# Patient Record
Sex: FEMALE | Race: WHITE
[De-identification: names, ages, dates, MRNs, and addresses within clinical notes are randomized per-mention and may not be internally consistent; named-entity substitution may affect disease eponyms.]

---

## 2017-12-30 ENCOUNTER — HOSPITAL ENCOUNTER (EMERGENCY)
Dept: HOSPITAL 17 - NEPD | Age: 42
Discharge: HOME | End: 2017-12-30
Payer: COMMERCIAL

## 2017-12-30 VITALS
OXYGEN SATURATION: 99 % | SYSTOLIC BLOOD PRESSURE: 102 MMHG | DIASTOLIC BLOOD PRESSURE: 64 MMHG | TEMPERATURE: 98.2 F | HEART RATE: 89 BPM | RESPIRATION RATE: 12 BRPM

## 2017-12-30 VITALS — HEIGHT: 64 IN | BODY MASS INDEX: 22.58 KG/M2 | WEIGHT: 132.28 LBS

## 2017-12-30 DIAGNOSIS — Z72.0: ICD-10-CM

## 2017-12-30 DIAGNOSIS — L03.113: Primary | ICD-10-CM

## 2017-12-30 PROCEDURE — 99283 EMERGENCY DEPT VISIT LOW MDM: CPT

## 2017-12-30 NOTE — PD
HPI


Chief Complaint:  Skin Problem


Time Seen by Provider:  14:51


Travel History


International Travel<30 days:  No


Contact w/Intl Traveler<30days:  No


Traveled to known affect area:  No





History of Present Illness


HPI


42-year-old female arrives here complaining of 2 days of rash on the dorsal 

aspect of the DRUJ on the right side with a similar rash in the region of the 

distal humerus/antecubital fossa on the right side.  No fever.  She reports 

living in a sober house now with multiple other women.  No similar prior 

episode.  She reports some itchiness in mild pain.  She denies any insect bites 

that she can remember.





PFSH


Past Medical History


Pregnant?:  Not Pregnant


LMP:  12/28/17





Social History


Tobacco Use:  Yes





Allergies-Medications


(Allergen,Severity, Reaction):  


Coded Allergies:  


     No Known Allergies (Verified  Allergy, Unknown, 12/30/17)





Review of Systems


General / Constitutional:  No: Fever


Respiratory:  No: Cough, Shortness of Breath





Physical Exam


Narrative


GENERAL: Well-nourished well-developed 42-year-old female no acute distress


SKIN: Warm and dry.  There is about 3 cm of mild hyperpigmentation of the DRUJ 

somewhat circumferential.  Similar process is noted again in the region of the 

medial somewhat more proximal aspect of the right arm also about 3 cm round, 

well demarcated.


HEAD: Normocephalic.


EYES: No scleral icterus. No injection or drainage.  


MUSCULOSKELETAL: No cyanosis, or edema. 


BACK: Nontender without obvious deformity. No CVA tenderness.








Data


Data


Last Documented VS





Vital Signs








  Date Time  Temp Pulse Resp B/P (MAP) Pulse Ox O2 Delivery O2 Flow Rate FiO2


 


12/30/17 14:35 98.2 89 12 102/64 (77) 99   





VS reviewed





[facility]


Medical Decision Making


Medical Screen Exam Complete:  Yes


Emergency Medical Condition:  Yes


Medical Record Reviewed:  Yes


Differential Diagnosis


MRSA, insect bite, urticaria


Narrative Course


Patient lives in a sober living house, potentially risk factor for MRSA.  

Bactrim DS.  Return precautions discussed.





Diagnosis





 Primary Impression:  


 Rash


 Additional Impression:  


 Cellulitis


 Qualified Codes:  L03.113 - Cellulitis of right upper limb


***Med/Other Pt SpecificInfo:  Prescription(s) given


Scripts


Sulfamethoxazole-Trimethoprim (Bactrim DS) 800-160 Mg Tab


1 TAB PO BID for Infection, #14 TAB 0 Refills


   Prov: Naresh Oneill MD         12/30/17


Disposition:  01 DISCHARGE HOME


Condition:  Stable











Naresh Oneill MD Dec 30, 2017 15:05

## 2018-01-03 ENCOUNTER — HOSPITAL ENCOUNTER (EMERGENCY)
Dept: HOSPITAL 17 - PHEFT | Age: 43
Discharge: HOME | End: 2018-01-03
Payer: COMMERCIAL

## 2018-01-03 VITALS
DIASTOLIC BLOOD PRESSURE: 70 MMHG | RESPIRATION RATE: 18 BRPM | OXYGEN SATURATION: 98 % | SYSTOLIC BLOOD PRESSURE: 134 MMHG | HEART RATE: 75 BPM | TEMPERATURE: 97.7 F

## 2018-01-03 VITALS — BODY MASS INDEX: 23.87 KG/M2 | WEIGHT: 134.7 LBS | HEIGHT: 63 IN
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
## 2018-01-03 DIAGNOSIS — S80.862A: ICD-10-CM

## 2018-01-03 DIAGNOSIS — Z72.0: ICD-10-CM

## 2018-01-03 DIAGNOSIS — S40.861A: Primary | ICD-10-CM

## 2018-01-03 PROCEDURE — 99284 EMERGENCY DEPT VISIT MOD MDM: CPT

## 2018-01-03 NOTE — PD
HPI


Chief Complaint:  Skin Problem


Time Seen by Provider:  09:12


Travel History


International Travel<30 days:  No


Contact w/Intl Traveler<30days:  No


Traveled to known affect area:  No





History of Present Illness


HPI


42 year old female here with multiple pruritic lesions to her right upper 

extremity and left lower extremity.  She was treated on 12/30/17 for possible 

insect bite/cellulitis to the right hand with Bactrim DS.  She reports since 

that time she's developed several more lesions.  Denies fever or chills.  

Denies spreading erythema.  Severity is mild.





PFSH


Past Medical History


Medical History:  Denies Significant Hx


Pregnant?:  Not Pregnant





Social History


Alcohol Use:  No


Tobacco Use:  Yes


Substance Use:  No





Allergies-Medications


(Allergen,Severity, Reaction):  


Coded Allergies:  


     No Known Allergies (Verified  Allergy, Unknown, 1/3/18)


Reported Meds & Prescriptions





Reported Meds & Active Scripts


Active


Ala-Storm Topical (Hydrocortisone (Topical)) 2.5% Cream 1 Applic TOPICAL AS 

DIRECTED


Diphenhydramine (Diphenhydramine HCl) 25 Mg Cap 25 Mg PO Q6H PRN


Bactrim DS (Sulfamethoxazole-Trimethoprim) 800-160 Mg Tab 1 Tab PO BID








Review of Systems


Except as stated in HPI:  all other systems reviewed are Neg


General / Constitutional:  No: Fever


Eyes:  No: Visual changes


HENT:  No: Headaches


Cardiovascular:  No: Chest Pain or Discomfort


Respiratory:  No: Shortness of Breath


Gastrointestinal:  No: Abdominal Pain


Genitourinary:  No: Dysuria





Physical Exam


Narrative


GENERAL: Alert female well-appearing.


SKIN: Warm and dry.  Multiple erythematous well demarcated lesions to the right 

hand and left lower extremity.  These appear to be insect bites.  No 

surrounding cellulitis or lymphangitis.  No fluctuance or induration.


HEAD: Normocephalic.


EYES:  No injection or drainage. 


NECK: Supple, trachea midline. 


MUSCULOSKELETAL: No cyanosis, or edema. 











Data


Data


Last Documented VS





Vital Signs








  Date Time  Temp Pulse Resp B/P (MAP) Pulse Ox O2 Delivery O2 Flow Rate FiO2


 


1/3/18 08:50 97.7 75 18 134/70 (91) 98   








Orders





 Orders


Ed Discharge Order (1/3/18 09:23)








MDM


Medical Decision Making


Medical Screen Exam Complete:  Yes


Emergency Medical Condition:  Yes


Differential Diagnosis


abscess, MRSA, insect bite, urticaria


Narrative Course


42 year old female here with multiple pruritic lesions to her right upper 

extremity and left lower extremity.  She was treated on 12/30/17 for possible 

insect bite/cellulitis to the right hand with Bactrim DS.  She reports since 

that time she's developed several more lesions.  These appear to be insect 

bites.  They do not appear to be infectious. Patient is scratching at theses 

lesions throughout her exam.  Vital signs are stable.  She denies fever or 

chills.





Diagnosis





 Primary Impression:  


 Insect bite


 Qualified Codes:  W57.XXXA - Bitten or stung by nonvenomous insect and other 

nonvenomous arthropods, initial encounter


Referrals:  


[facility]





***Additional Instructions:  


Return to ED if he developed new or worsening symptoms which would include 

increased redness, swelling, fever or chills


Scripts


Hydrocortisone (Topical) (Ala-Storm Topical) 2.5% Cream


1 APPLIC TOPICAL AS DIRECTED for Inflammation, #1 TUBE 0 Refills


   Prov: Linda Bravo         1/3/18 


Diphenhydramine (Diphenhydramine) 25 Mg Cap


25 MG PO Q6H Y for ALLERGIES, #30 CAP 0 Refills


   Prov: Linda Bravo         1/3/18


Disposition:  01 DISCHARGE HOME


Condition:  Stable











Linda Bravo Sincere 3, 2018 09:23